# Patient Record
Sex: FEMALE | Race: WHITE | NOT HISPANIC OR LATINO | Employment: OTHER | URBAN - METROPOLITAN AREA
[De-identification: names, ages, dates, MRNs, and addresses within clinical notes are randomized per-mention and may not be internally consistent; named-entity substitution may affect disease eponyms.]

---

## 2017-05-03 ENCOUNTER — THERAPY VISIT (OUTPATIENT)
Dept: PHYSICAL THERAPY | Facility: CLINIC | Age: 62
End: 2017-05-03
Payer: COMMERCIAL

## 2017-05-03 DIAGNOSIS — M25.512 PAIN IN JOINT OF LEFT SHOULDER: Primary | ICD-10-CM

## 2017-05-03 PROCEDURE — 97161 PT EVAL LOW COMPLEX 20 MIN: CPT | Mod: GP | Performed by: PHYSICAL THERAPIST

## 2017-05-03 PROCEDURE — 97110 THERAPEUTIC EXERCISES: CPT | Mod: GP | Performed by: PHYSICAL THERAPIST

## 2017-05-03 PROCEDURE — 97112 NEUROMUSCULAR REEDUCATION: CPT | Mod: GP | Performed by: PHYSICAL THERAPIST

## 2017-05-03 NOTE — LETTER
[facility] ATHLETIC [facility] PHYSICAL THERAPY  [address]  [facility] [address]  [phone]    May 3, 2017    Re: Sujatha Alfredo   :   1955  MRN:  [id number]   REFERRING PHYSICIAN:   Lydia Gar MD    [facility] ATHLETIC [facility] PHYSICAL [facility]  Date of Initial Evaluation:  5/3/17  Visits:  Rxs Used: 1  Reason for Referral:  Pain in joint of left shoulder    EVALUATION SUMMARY    Subjective:    Patient is a 61 year old female presenting with rehab left shoulder hpi. The history is provided by the patient. No  was used.  Sujatha Alfredo is a 61 year old female with a left shoulder condition.  Condition occurred with:  Unknown cause.  Condition occurred: for unknown reasons.  Pt c/o L shoulder pain x 3 months.  Denies injury.  MD visit date 17.  Pt is R handed.  Had injection with good results.  Patient reports pain:  Anterior.  Pain is described as shooting and stabbing and is intermittent and reported as 3/10 and 8/10.  Associated symptoms:  Loss of motion/stiffness and loss of strength. Pain is the same all the time.  Symptoms are exacerbated by using arm overhead, lying on extremity, using arm behind back and lifting and relieved by rest.  Since onset symptoms are gradually improving (since injection).  Special tests:  X-ray (- per pt).  General health as reported by patient is good.  Pertinent medical history includes:  Smoking and overweight.  Medical allergies: no.  Other surgeries include:  None reported.  Current medications:  None as reported by the patient.  Current occupation is Retired.                      Objective:    Standing Alignment:  Shoulder/UE:  Rounded shoulders        Shoulder Evaluation:  ROM:  AROM:  normal (ERP all ROM L shoulder)  PROM:  normal  Strength:  : fair scap stab.  Flexion: Left:5-/5    Pain: +    Right: /5 strong/pain free    Pain:   Extension:  Left:/5 Strong/pain free     Pain:    Right: /5  Strong/pain free  Pain:  Abduction:  Left: 5- and 4+/5   Pain:-/+    Right:/5  Strong/pain free    Pain:  Adduction:  Left:/5  Strong/pain free   Pain:    Right:/5  Strong/pain free    Pain:  Internal Rotation:  Left:/5  Strong/pain free    Pain:    Right:/5  Strong/pain free    Pain:  External Rotation:   Left:4/5      Pain:+   Right:/5  Strong/pain free    Pain:    Special Tests:    Left shoulder positive for the following special tests:  Impingement  Palpation:  Left shoulder tenderness present at:  Supraspinatus  Mobility Tests:  Scapulohumeral rhythm right:  Hypermobile       Re: Sujatha Alfredo   :   1955                 Assessment/Plan:      Patient is a 61 year old female with left side shoulder complaints.  Patient has the following significant findings with corresponding treatment plan.  Diagnosis 1:  L shoulder pain  Pain -  hot/cold therapy, directional preference exercise and home program.  Decreased ROM/flexibility - manual therapy and therapeutic exercise.  Decreased strength - therapeutic exercise and therapeutic activities.  Impaired muscle performance - neuro re-education.  Decreased function - therapeutic activities.  Impaired posture - neuro re-education    Therapy Evaluation Codes:   1) History comprised of:   Personal factors that impact the plan of care:      None.    Comorbidity factors that impact the plan of care are:      Overweight and Smoking.     Medications impacting care: None.  2) Examination of Body Systems comprised of:   Body structures and functions that impact the plan of care:      Shoulder.   Activity limitations that impact the plan of care are:      Bathing, Dressing, Lifting and Sleeping.  3) Clinical presentation characteristics are:   Stable/Uncomplicated.  4) Decision-Making    Low complexity using standardized patient assessment instrument and/or measureable assessment of functional outcome.  Cumulative Therapy Evaluation is: Low  complexity.    Previous and current functional limitations:  (See Goal Flow Sheet for this information).     Short term and Long term goals: (See Goal Flow Sheet for this information).  Communication ability:  Patient appears to be able to clearly communicate and understand verbal and written communication and follow directions correctly.  Treatment Explanation - The following has been discussed with the patient:   RX ordered/plan of care.  This patient would benefit from PT intervention to resume normal activities.  Rehab potential is excellent.    Frequency:  1 X week, once daily  Duration:  for 8 weeks  Discharge Plan:  Achieve all LTG.  Independent in home treatment program.  Reach maximal therapeutic benefit.    Thank you for your referral.    INQUIRIES  Therapist:  [place], [facility]  [facility] PH[facility]  [id number] [place] [id number]  Phone: [phone]  Fax: [phone]

## 2017-05-03 NOTE — MR AVS SNAPSHOT
After Visit Summary   5/3/2017    Sujatha Alfredo    MRN: [id number]           Patient Information     Date Of Birth          1955        Visit Information        Provider Department      5/3/2017 1:00 PM [place] Kent, PT [facility]        Today's Diagnoses     Pain in joint of left shoulder    -  1       Follow-ups after your visit        Your next 10 appointments already scheduled     May 18, 2017 11:40 AM CDT   LILIB[facility] Extremity with [place] PT   [facility] Therapy ([facility] [place])    [address]  [facility] [id number] [phone]              Who to contact     If you have questions or need follow up information about today's clinic visit or your schedule please contact [facility] ATHLETIC [facility] PHYSICAL THERAPY directly at [phone].  Normal or non-critical lab and imaging results will be communicated to you by [employer], letter or phone within 4 business days after the clinic has received the results. If you do not hear from us within 7 days, please contact the clinic through [employer] or phone. If you have a critical or abnormal lab result, we will notify you by phone as soon as possible.  Submit refill requests through [employer] or call your pharmacy and they will forward the refill request to us. Please allow 3 business days for your refill to be completed.          Additional Information About Your Visit        MyChart Information     [employer] gives you secure access to your electronic health record. If you see a primary care provider, you can also send messages to your care team and make appointments. If you have questions, please call your primary care clinic.  If you do not have a primary care provider, please call [phone] and they will assist you.        Care EveryWhere ID     This is your Care EveryWhere ID. This could be used  by other organizations to access your [place] medical records  [id number]        Your Vitals Were     Last Period                   01/01/2003            Blood Pressure from Last 3 Encounters:   09/18/15 127/61   09/10/15 (!) 142/102   04/26/13 114/81    Weight from Last 3 Encounters:   09/10/15 85.2 kg (187 lb 14.4 oz)   04/26/13 78 kg (172 lb)   03/28/13 83.5 kg (184 lb 1.6 oz)              We Performed the Following     HC PT EVAL, LOW COMPLEXITY     LILIBETH INITIAL EVAL REPORT     NEUROMUSCULAR RE-EDUCATION     THERAPEUTIC EXERCISES        Primary Care Provider Office Phone # Fax #    Tom Alfredo [phone]       [address] [address]        Thank you!     Thank you for choosing [facility] PHYSICAL THERAP[facility]  for your care. Our goal is always to provide you with excellent care. Hearing back from our patients is one way we can continue to improve our services. Please take a few minutes to complete the written survey that you may receive in the mail after your visit with us. Thank you!             Your Updated Medication List - Protect others around you: Learn how to safely use, store and throw away your medicines at www.disposemymeds.org.      Notice  As of 5/3/2017  1:33 PM    You have not been prescribed any medications.

## 2017-05-03 NOTE — PROGRESS NOTES
Subjective:    Patient is a 61 year old female presenting with rehab left shoulder hpi. The history is provided by the patient. No  was used.   Sujatha Alfredo is a 61 year old female with a left shoulder condition.  Condition occurred with:  Unknown cause.  Condition occurred: for unknown reasons.    Pt c/o L shoulder pain x 3 months.  Denies injury.  MD visit date 4/17/17.  Pt is R handed.  Had injection with good results..    Patient reports pain:  Anterior.    Pain is described as shooting and stabbing and is intermittent and reported as 3/10 and 8/10.  Associated symptoms:  Loss of motion/stiffness and loss of strength. Pain is the same all the time.  Symptoms are exacerbated by using arm overhead, lying on extremity, using arm behind back and lifting and relieved by rest.  Since onset symptoms are gradually improving (since injection).  Special tests:  X-ray (- per pt).      General health as reported by patient is good.  Pertinent medical history includes:  Smoking and overweight.  Medical allergies: no.  Other surgeries include:  None reported.  Current medications:  None as reported by the patient.  Current occupation is Retired  .                                    Objective:    Standing Alignment:      Shoulder/UE:  Rounded shoulders                                       Shoulder Evaluation:  ROM:  AROM:  normal (ERP all ROM L shoulder)                            PROM:  normal                                Strength:  : fair scap stab.  Flexion: Left:5-/5    Pain: +    Right: /5 strong/pain free    Pain:   Extension:  Left:/5 Strong/pain free    Pain:    Right: /5  Strong/pain free  Pain:  Abduction:  Left: 5- and 4+/5   Pain:-/+    Right:/5  Strong/pain free    Pain:  Adduction:  Left:/5  Strong/pain free   Pain:    Right:/5  Strong/pain free    Pain:  Internal Rotation:  Left:/5  Strong/pain free    Pain:    Right:/5  Strong/pain free    Pain:  External Rotation:   Left:4/5       Pain:+   Right:/5  Strong/pain free    Pain:              Special Tests:    Left shoulder positive for the following special tests:  Impingement    Palpation:    Left shoulder tenderness present at:  Supraspinatus    Mobility Tests:                Scapulohumeral rhythm right:  Hypermobile                                     General     ROS    Assessment/Plan:      Patient is a 61 year old female with left side shoulder complaints.    Patient has the following significant findings with corresponding treatment plan.                Diagnosis 1:  L shoulder pain  Pain -  hot/cold therapy, directional preference exercise and home program  Decreased ROM/flexibility - manual therapy and therapeutic exercise  Decreased strength - therapeutic exercise and therapeutic activities  Impaired muscle performance - neuro re-education  Decreased function - therapeutic activities  Impaired posture - neuro re-education    Therapy Evaluation Codes:   1) History comprised of:   Personal factors that impact the plan of care:      None.    Comorbidity factors that impact the plan of care are:      Overweight and Smoking.     Medications impacting care: None.  2) Examination of Body Systems comprised of:   Body structures and functions that impact the plan of care:      Shoulder.   Activity limitations that impact the plan of care are:      Bathing, Dressing, Lifting and Sleeping.  3) Clinical presentation characteristics are:   Stable/Uncomplicated.  4) Decision-Making    Low complexity using standardized patient assessment instrument and/or measureable assessment of functional outcome.  Cumulative Therapy Evaluation is: Low complexity.    Previous and current functional limitations:  (See Goal Flow Sheet for this information)    Short term and Long term goals: (See Goal Flow Sheet for this information)     Communication ability:  Patient appears to be able to clearly communicate and understand verbal and written communication and follow  directions correctly.  Treatment Explanation - The following has been discussed with the patient:   RX ordered/plan of care  Anticipated outcomes  Possible risks and side effects  This patient would benefit from PT intervention to resume normal activities.   Rehab potential is excellent.    Frequency:  1 X week, once daily  Duration:  for 8 weeks  Discharge Plan:  Achieve all LTG.  Independent in home treatment program.  Reach maximal therapeutic benefit.    Please refer to the daily flowsheet for treatment today, total treatment time and time spent performing 1:1 timed codes.

## 2017-08-05 ENCOUNTER — HEALTH MAINTENANCE LETTER (OUTPATIENT)
Age: 62
End: 2017-08-05

## 2017-10-18 ENCOUNTER — HOSPITAL ENCOUNTER (OUTPATIENT)
Dept: MAMMOGRAPHY | Facility: CLINIC | Age: 62
Discharge: HOME | End: 2017-10-18
Attending: INTERNAL MEDICINE | Admitting: INTERNAL MEDICINE
Payer: COMMERCIAL

## 2017-10-18 DIAGNOSIS — Z12.31 VISIT FOR SCREENING MAMMOGRAM: ICD-10-CM

## 2017-10-18 PROCEDURE — 77063 BREAST TOMOSYNTHESIS BI: CPT

## 2018-08-12 ENCOUNTER — HEALTH MAINTENANCE LETTER (OUTPATIENT)
Age: 63
End: 2018-08-12

## 2018-10-16 ENCOUNTER — HOSPITAL ENCOUNTER (OUTPATIENT)
Dept: MAMMOGRAPHY | Facility: CLINIC | Age: 63
Discharge: HOME | End: 2018-10-16
Attending: INTERNAL MEDICINE | Admitting: INTERNAL MEDICINE
Payer: COMMERCIAL

## 2018-10-16 DIAGNOSIS — Z12.31 SCREENING MAMMOGRAM, ENCOUNTER FOR: ICD-10-CM

## 2018-10-16 PROCEDURE — 77067 SCR MAMMO BI INCL CAD: CPT

## 2018-11-29 ENCOUNTER — OFFICE VISIT (OUTPATIENT)
Dept: SURGERY | Facility: CLINIC | Age: 63
End: 2018-11-29
Payer: COMMERCIAL

## 2018-11-29 VITALS
SYSTOLIC BLOOD PRESSURE: 130 MMHG | BODY MASS INDEX: 28.59 KG/M2 | DIASTOLIC BLOOD PRESSURE: 76 MMHG | WEIGHT: 177.9 LBS | TEMPERATURE: 98.4 F | OXYGEN SATURATION: 97 % | HEART RATE: 81 BPM | HEIGHT: 66 IN

## 2018-11-29 DIAGNOSIS — R10.32 LEFT LOWER QUADRANT PAIN: Primary | ICD-10-CM

## 2018-11-29 RX ORDER — ASPIRIN 81 MG/1
81 TABLET, CHEWABLE ORAL
COMMUNITY
Start: 2015-04-02

## 2018-11-29 RX ORDER — LOSARTAN POTASSIUM 50 MG/1
TABLET ORAL
Refills: 1 | COMMUNITY
Start: 2018-10-25

## 2018-11-29 RX ORDER — ROSUVASTATIN CALCIUM 5 MG/1
TABLET, COATED ORAL
Refills: 0 | COMMUNITY
Start: 2018-08-30

## 2018-11-29 ASSESSMENT — PAIN SCALES - GENERAL: PAINLEVEL: NO PAIN (0)

## 2018-11-29 NOTE — MR AVS SNAPSHOT
"              After Visit Summary   11/29/2018    Sujatha Alfredo    MRN: [id number]           Patient Information     Date Of Birth          1955        Visit Information        Provider Department      11/29/2018 12:40 PM Bronson Mane MD [facility]        Today's Diagnoses     Left lower quadrant pain    -  1       Follow-ups after your visit        Who to contact     Please call your clinic at [phone] to:    Ask questions about your health    Make or cancel appointments    Discuss your medicines    Learn about your test results    Speak to your doctor            Additional Information About Your Visit        MyChart Information     "[employer] gives you secure access to your electronic health record. If you see a primary care provider, you can also send messages to your care team and make appointments. If you have questions, please call your primary care clinic.  If you do not have a primary care provider, please call [phone] and they will assist you.      "[employer] is an electronic gateway that provides easy, online access to your medical records. With "[employer] you can request a clinic appointment, read your test results, renew a prescription or communicate with your care team.     To access your existing account, please contact your [facility] or call [phone] for assistance.        Care EveryWhere ID     This is your Care EveryWhere ID. This could be used by other organizations to access your [place] medical records  [id number]        Your Vitals Were     Pulse Temperature Height Last Period Pulse Oximetry BMI (Body Mass Index)    81 98.4  F (36.9  C) (Oral) 1.676 m (5' 6\") 01/01/2003 97% 28.71 kg/m2       Blood Pressure from Last 3 Encounters:   11/29/18 130/76   09/18/15 127/61   09/10/15 (!) 142/102    Weight from Last 3 Encounters:   11/29/18 80.7 kg (177 lb 14.4 oz)   09/10/15 85.2 kg (187 lb 14.4 oz)   04/26/13 78 kg " (172 lb)              Today, you had the following     No orders found for display       Primary Care Provider Office Phone # Fax #    Tom Alfredo [phone]       [place] [address] [address] [facility] [address]        Equal Access to Services     ISHAN ORTEGA : Hadii melchor king marthao Soomaali, waaxda luqadaha, qaybta kaalmada adedarbyyada, randall kennyin hayaan robbydarby katz laRonalkwadwo presley. So [facility] [phone].    ATENCIÓN: Si habla español, tiene a hermosillo disposición servicios gratuitos de asistencia lingüística. Llame al [phone].    We comply with applicable federal civil rights laws and [place] laws. We do not discriminate on the basis of race, color, national origin, age, disability, sex, sexual orientation, or gender identity.            Thank you!     Thank you for choosing [facility] SURGICAL WEIGHT MANAGEMENT  for your care. Our goal is always to provide you with excellent care. Hearing back from our patients is one way we can continue to improve our services. Please take a few minutes to complete the written survey that you may receive in the mail after your visit with us. Thank you!             Your Updated Medication List - Protect others around you: Learn how to safely use, store and throw away your medicines at www.disposemymeds.org.          This list is accurate as of 11/29/18  3:39 PM.  Always use your most recent med list.                   Brand Name Dispense Instructions for use Diagnosis    aspirin 81 MG chewable tablet    ASA     Take 81 mg by mouth        FLUoxetine 20 MG capsule    PROzac     Take 20 mg by mouth        losartan 50 MG tablet    COZAAR     TK 1 T PO D        rosuvastatin 5 MG tablet    CRESTOR     TK 1 T PO D

## 2018-11-29 NOTE — PROGRESS NOTES
Return Bariatric Surgery Note    RE: Sujatha Alfredo  MR#: [id number]  : 1955  VISIT DATE: 2018      I had the pleasure of seeing Sujatha Alfredo in my post-bariatric surgery assessment clinic.    CHIEF COMPLAINT: Post-bariatric surgery follow-up    Sujatha has a pain in the left lower quadrant that is not associated with eating.    Has no problems with reflux/heartburn.    Not worse with activity levels; says that the pain is present when she is lying down.    No associated bulge.    Has only had lapband and conversion to gastric bypass operations (and diagnostic laparoscopy about one year later for obstruction) with incisions all located at or above the umbilicus.    No associated bowel obstructions or other hospitalizations related to this.    There is a CT scan, not available for review today, in the [facility] system.    HISTORY OF PRESENT ILLNESS:  Questions Regarding Prior Weight Loss Surgery Reviewed With Patient 2018   I had the following weight loss procedure: Corey-en-y Gastric Bypass   What year was your surgery? approx 12 years ago?   How has your weight changed since your last visit? I have stayed about the same   Are you currently taking any weight loss medications? No   Do you currently have any of the following: None of the above   Have you been to the Emergency room since your last visit with us? No   Were you in the hospital since your last visit with us? No   Do you have any concerns today? yes, hernia       Weight History:     2018   What is your highest lifetime weight? 250   What is your lowest weight since surgery? (In pounds) 163        Current Weight: Weight: 80.7 kg (177 lb 14.4 oz)          Questions Regarding Co-Morbidities and Health Concerns Reviewed With Patient 2018   Pre-diabetes: Never   Diabetes II: Never   High Blood Pressure: Never   High cholesterol: Never   Heartburn/Reflux: Never   Sleep apnea: Never   PCOS: Never   Back pain: Gone  "away   Joint pain: Never   Lower leg swelling: Never       Eating Habits 11/28/2018   How many meals do you eat per day? 3   Do you snack between meals? Sometimes   How much food are you eating at each meal? 1/2 cup to 1 cup   Are you able to separate your meals and liquids by at least 30 minutes? Sometimes   Are you able to avoid liquid calories? Sometimes       Exercise Questions Reviewed With Patient 11/28/2018   How often do you exercise? 1 to 2 times per week   What is the duration of your exercise (in minutes)? 30 Minutes   What types of exercise do you do? home gym   What keeps you from being more active?  I should be more active but I just have not gotten around to it       Social History:      11/28/2018   Are you smoking? No   Are you drinking alcohol? Yes   How much alcohol? 1 glass/day       Medications:  Current Outpatient Prescriptions   Medication     aspirin (ASA) 81 MG chewable tablet     FLUoxetine (PROZAC) 20 MG capsule     losartan (COZAAR) 50 MG tablet     rosuvastatin (CRESTOR) 5 MG tablet     No current facility-administered medications for this visit.          11/28/2018   Do you avoid NSAIDs such as (Ibuprofen, Aleve, Naproxen, Advil)?   Yes       ROS:  GI:      11/28/2018   Vomiting: No   Diarrhea: No   Constipation: No   Swallowing trouble: No   Abdominal pain: Yes   Heartburn: Yes   Rash in skin folds: No   Depression: No   Stress urinary incontinence No     Skin:   BAR RBS ROS - SKIN 11/28/2018   Rash in skin folds: No     Psych:      11/28/2018   Depression: No   Anxiety: No     Female Only:   TONJA RBS ROS - FEMALE ONLY 11/28/2018   Female only: None of the above       LABS/IMAGING/MEDICAL RECORDS REVIEW: done by pcp.  Reports taking recommended vitamins.    PHYSICAL EXAMINATION:  /76  Pulse 81  Temp 98.4  F (36.9  C) (Oral)  Ht 1.676 m (5' 6\")  Wt 80.7 kg (177 lb 14.4 oz)  LMP 01/01/2003  SpO2 97%  BMI 28.71 kg/m2   NAD  Left lower quadrant of abdomen has point tenderness " about 10cm left and 8cm below umbilicus.  No peritonitis.  No bulge.  No associated skin color changes.  Nearest incision is well above the area.    ASSESSMENT AND PLAN:      1. Many  years status laparoscopic gastric bypass (after prior lapband)  2. Morbid Obesity historical; gastric achlorhydria and left lower quadrant abdominal pain current current BMI: Body mass index is 28.71 kg/(m^2).  3. Post surgical malabsorption:   Labs ordered per protocol.   Follow food plan per dietitian recommendations.   Continue taking recommended post-op vitamins.  4. I will review CT scan images from CT scan [facility] kidney [facility].  Images not available to me currently..    Sincerely,    Bronson Mane MD    I spent a total of 25 minutes face to face with Sujatha during today's office visit. Over 50% of this time was spent counseling the patient and/or coordinating care.        CT scan report available:    CT Abd Pelvis W/WO IV Cont IVP Hematuria (11/01/2018 12:35 PM CDT)  CT Abd Pelvis W/WO IV Cont IVP Hematuria (11/01/2018 12:35 PM CDT)   Narrative Performed At   [facility]    CT ABDOMEN AND PELVIS WITHOUT AND WITH CONTRAST (CT IVP)    11/1/2018 12:35 PM        INDICATION: Gross hematuria.    TECHNIQUE: Helical thin-section CT scan of the abdomen and pelvis was performed before and after injection of IV contrast. Precontrast, 100 seconds postinjection, and 10-minute delayed images post 250 mL normal saline were performed as per CT IVP protocol. Postprocessing of data. From the helical source data, thin-section reconstruction performed in the axial plane with coronal AP reformats. Dose reduction techniques were used.     IV CONTRAST: 125 mL Omnipaque 350.    COMPARISON: None.        FINDINGS:    LUNG BASES: Clear.        CT IVP: No urinary tract stones identified. No hydronephrosis. Small focus of vascular calcification seen within the left renal pelvis on image 34 of series 4. Delayed imaging does not  demonstrate any urothelial lesions.        ABDOMEN: The liver is low-dense in appearance, compatible with hepatic steatosis. The kidneys, adrenal glands, spleen, gallbladder, and pancreas are grossly normal.        The abdominal aorta is normal in caliber with diffuse atherosclerotic calcifications. No lymphadenopathy. Surgical changes of a Corey-en-Y gastric bypass. No bowel obstruction or abnormal bowel wall thickening.        PELVIS: Air is present within the lumen of the urinary bladder. No urothelial lesions or evidence of urinary bladder stones. Calcifications involving the uterus with the largest seen along the right side of uterus are compatible with calcified fibroids. No pelvic free fluid or lymphadenopathy.        MUSCULOSKELETAL: There is bilateral spondylolysis of L5. Severe L5-S1 disc height loss.        CONCLUSION:    1.  No urinary tract stones or urothelial lesions to explain patient's hematuria.    2.  Small amount of air within the lumen of the urinary bladder. This could be due to recent urinary bladder instrumentation. An infectious process is also a consideration. A fistulous communication from the bowel to bladder is a possibility though no obvious fistulous tract is seen. A cystogram could be performed if this is of clinical concern.    3.  Hepatic steatosis.

## 2018-11-29 NOTE — LETTER
2018       RE: Sujatha Alfredo  [address]  [facility] [address]     Dear Colleague,    Thank you for referring your patient, Sujatha Alfredo, to the [facility] SURGICAL WEIGHT MANAGEMENT at [facility]. Please see a copy of my visit note below.        Return Bariatric Surgery Note    RE: Sujatha Alfredo  MR#: [id number]  : 1955  VISIT DATE: 2018      I had the pleasure of seeing Sujatha Alfredo in my post-bariatric surgery assessment clinic.    CHIEF COMPLAINT: Post-bariatric surgery follow-up    Sujatha has a pain in the left lower quadrant that is not associated with eating.    Has no problems with reflux/heartburn.    Not worse with activity levels; says that the pain is present when she is lying down.    No associated bulge.    Has only had lapband and conversion to gastric bypass operations (and diagnostic laparoscopy about one year later for obstruction) with incisions all located at or above the umbilicus.    No associated bowel obstructions or other hospitalizations related to this.    There is a CT scan, not available for review today, in the [facility] system.    HISTORY OF PRESENT ILLNESS:  Questions Regarding Prior Weight Loss Surgery Reviewed With Patient 2018   I had the following weight loss procedure: Corey-en-y Gastric Bypass   What year was your surgery? approx 12 years ago?   How has your weight changed since your last visit? I have stayed about the same   Are you currently taking any weight loss medications? No   Do you currently have any of the following: None of the above   Have you been to the Emergency room since your last visit with us? No   Were you in the hospital since your last visit with us? No   Do you have any concerns today? yes, hernia       Weight History:     2018   What is your highest lifetime weight? 250   What is your lowest weight since surgery? (In pounds) 163        Current  Weight: Weight: 80.7 kg (177 lb 14.4 oz)          Questions Regarding Co-Morbidities and Health Concerns Reviewed With Patient 11/28/2018   Pre-diabetes: Never   Diabetes II: Never   High Blood Pressure: Never   High cholesterol: Never   Heartburn/Reflux: Never   Sleep apnea: Never   PCOS: Never   Back pain: Gone away   Joint pain: Never   Lower leg swelling: Never       Eating Habits 11/28/2018   How many meals do you eat per day? 3   Do you snack between meals? Sometimes   How much food are you eating at each meal? 1/2 cup to 1 cup   Are you able to separate your meals and liquids by at least 30 minutes? Sometimes   Are you able to avoid liquid calories? Sometimes       Exercise Questions Reviewed With Patient 11/28/2018   How often do you exercise? 1 to 2 times per week   What is the duration of your exercise (in minutes)? 30 Minutes   What types of exercise do you do? home gym   What keeps you from being more active?  I should be more active but I just have not gotten around to it       Social History:      11/28/2018   Are you smoking? No   Are you drinking alcohol? Yes   How much alcohol? 1 glass/day       Medications:  Current Outpatient Prescriptions   Medication     aspirin (ASA) 81 MG chewable tablet     FLUoxetine (PROZAC) 20 MG capsule     losartan (COZAAR) 50 MG tablet     rosuvastatin (CRESTOR) 5 MG tablet     No current facility-administered medications for this visit.          11/28/2018   Do you avoid NSAIDs such as (Ibuprofen, Aleve, Naproxen, Advil)?   Yes       ROS:  GI:      11/28/2018   Vomiting: No   Diarrhea: No   Constipation: No   Swallowing trouble: No   Abdominal pain: Yes   Heartburn: Yes   Rash in skin folds: No   Depression: No   Stress urinary incontinence No     Skin:   BAR RBS ROS - SKIN 11/28/2018   Rash in skin folds: No     Psych:      11/28/2018   Depression: No   Anxiety: No     Female Only:   BAR RBS ROS - FEMALE ONLY 11/28/2018   Female only: None of the above  "      LABS/IMAGING/MEDICAL RECORDS REVIEW: done by pcp.  Reports taking recommended vitamins.    PHYSICAL EXAMINATION:  /76  Pulse 81  Temp 98.4  F (36.9  C) (Oral)  Ht 1.676 m (5' 6\")  Wt 80.7 kg (177 lb 14.4 oz)  LMP 01/01/2003  SpO2 97%  BMI 28.71 kg/m2   NAD  Left lower quadrant of abdomen has point tenderness about 10cm left and 8cm below umbilicus.  No peritonitis.  No bulge.  No associated skin color changes.  Nearest incision is well above the area.    ASSESSMENT AND PLAN:      1. Many  years status laparoscopic gastric bypass (after prior lapband)  2. Morbid Obesity historical; gastric achlorhydria and left lower quadrant abdominal pain current current BMI: Body mass index is 28.71 kg/(m^2).  3. Post surgical malabsorption:   Labs ordered per protocol.   Follow food plan per dietitian recommendations.   Continue taking recommended post-op vitamins.  4. I will review CT scan images from CT scan for kidney disease at [facility].  Images not available to me currently..    Sincerely,    Bronson Mane MD    I spent a total of 25 minutes face to face with Sujatha during today's office visit. Over 50% of this time was spent counseling the patient and/or coordinating care.        CT scan report available:    CT Abd Pelvis W/WO IV Cont IVP Hematuria (11/01/2018 12:35 PM CDT)  CT Abd Pelvis W/WO IV Cont IVP Hematuria (11/01/2018 12:35 PM CDT)   Narrative Performed At   [facility]    CT ABDOMEN AND PELVIS WITHOUT AND WITH CONTRAST (CT IVP)    11/1/2018 12:35 PM        INDICATION: Gross hematuria.    TECHNIQUE: Helical thin-section CT scan of the abdomen and pelvis was performed before and after injection of IV contrast. Precontrast, 100 seconds postinjection, and 10-minute delayed images post 250 mL normal saline were performed as per CT IVP protocol. Postprocessing of data. From the helical source data, thin-section reconstruction performed in the axial plane with coronal AP reformats. " Dose reduction techniques were used.     IV CONTRAST: 125 mL Omnipaque 350.    COMPARISON: None.        FINDINGS:    LUNG BASES: Clear.        CT IVP: No urinary tract stones identified. No hydronephrosis. Small focus of vascular calcification seen within the left renal pelvis on image 34 of series 4. Delayed imaging does not demonstrate any urothelial lesions.        ABDOMEN: The liver is low-dense in appearance, compatible with hepatic steatosis. The kidneys, adrenal glands, spleen, gallbladder, and pancreas are grossly normal.        The abdominal aorta is normal in caliber with diffuse atherosclerotic calcifications. No lymphadenopathy. Surgical changes of a Corey-en-Y gastric bypass. No bowel obstruction or abnormal bowel wall thickening.        PELVIS: Air is present within the lumen of the urinary bladder. No urothelial lesions or evidence of urinary bladder stones. Calcifications involving the uterus with the largest seen along the right side of uterus are compatible with calcified fibroids. No pelvic free fluid or lymphadenopathy.        MUSCULOSKELETAL: There is bilateral spondylolysis of L5. Severe L5-S1 disc height loss.        CONCLUSION:    1.  No urinary tract stones or urothelial lesions to explain patient's hematuria.    2.  Small amount of air within the lumen of the urinary bladder. This could be due to recent urinary bladder instrumentation. An infectious process is also a consideration. A fistulous communication from the bowel to bladder is a possibility though no obvious fistulous tract is seen. A cystogram could be performed if this is of clinical concern.    3.  Hepatic steatosis.          Again, thank you for allowing me to participate in the care of your patient.      Sincerely,    Bronson Mane MD

## 2018-11-29 NOTE — NURSING NOTE
"Chief Complaint   Patient presents with     Consult       Vitals:    11/29/18 1230   BP: 130/76   Pulse: 81   Temp: 98.4  F (36.9  C)   TempSrc: Oral   SpO2: 97%   Weight: 80.7 kg (177 lb 14.4 oz)   Height: 1.676 m (5' 6\")       Body mass index is 28.71 kg/(m^2).    TONIE Ruiz                          "

## 2018-11-30 ENCOUNTER — CARE COORDINATION (OUTPATIENT)
Dept: SURGERY | Facility: CLINIC | Age: 63
End: 2018-11-30

## 2018-12-03 ENCOUNTER — CARE COORDINATION (OUTPATIENT)
Dept: SURGERY | Facility: CLINIC | Age: 63
End: 2018-12-03

## 2018-12-03 NOTE — PROGRESS NOTES
Patient notified per Dr. Mane after reviewing Ct that there was no evidence of any problems. Patient was ok with notification no further questions.

## 2019-11-01 ENCOUNTER — HOSPITAL ENCOUNTER (OUTPATIENT)
Dept: MAMMOGRAPHY | Facility: CLINIC | Age: 64
Discharge: HOME | End: 2019-11-01
Attending: INTERNAL MEDICINE | Admitting: INTERNAL MEDICINE
Payer: COMMERCIAL

## 2019-11-01 DIAGNOSIS — Z12.31 OTHER SCREENING MAMMOGRAM: ICD-10-CM

## 2019-11-01 PROCEDURE — 77063 BREAST TOMOSYNTHESIS BI: CPT

## 2019-11-05 ENCOUNTER — HEALTH MAINTENANCE LETTER (OUTPATIENT)
Age: 64
End: 2019-11-05

## 2020-02-16 ENCOUNTER — HEALTH MAINTENANCE LETTER (OUTPATIENT)
Age: 65
End: 2020-02-16

## 2020-11-04 ENCOUNTER — HOSPITAL ENCOUNTER (OUTPATIENT)
Dept: MAMMOGRAPHY | Facility: CLINIC | Age: 65
Discharge: HOME | End: 2020-11-04
Attending: FAMILY MEDICINE | Admitting: FAMILY MEDICINE
Payer: COMMERCIAL

## 2020-11-04 DIAGNOSIS — Z12.31 VISIT FOR SCREENING MAMMOGRAM: ICD-10-CM

## 2020-11-04 PROCEDURE — 77067 SCR MAMMO BI INCL CAD: CPT

## 2021-04-04 ENCOUNTER — HEALTH MAINTENANCE LETTER (OUTPATIENT)
Age: 66
End: 2021-04-04

## 2021-09-19 ENCOUNTER — HEALTH MAINTENANCE LETTER (OUTPATIENT)
Age: 66
End: 2021-09-19

## 2021-11-15 ENCOUNTER — HOSPITAL ENCOUNTER (OUTPATIENT)
Dept: MAMMOGRAPHY | Facility: CLINIC | Age: 66
Discharge: HOME | End: 2021-11-15
Attending: INTERNAL MEDICINE | Admitting: INTERNAL MEDICINE
Payer: COMMERCIAL

## 2021-11-15 DIAGNOSIS — Z12.31 VISIT FOR SCREENING MAMMOGRAM: ICD-10-CM

## 2021-11-15 PROCEDURE — 77063 BREAST TOMOSYNTHESIS BI: CPT

## 2022-01-18 ENCOUNTER — TRANSCRIBE ORDERS (OUTPATIENT)
Dept: OTHER | Age: 67
End: 2022-01-18

## 2022-01-18 DIAGNOSIS — Z98.890 S/P THORACOTOMY: Primary | ICD-10-CM

## 2022-03-09 ENCOUNTER — HOSPITAL ENCOUNTER (OUTPATIENT)
Dept: CARDIAC REHAB | Facility: CLINIC | Age: 67
End: 2022-03-09
Attending: THORACIC SURGERY (CARDIOTHORACIC VASCULAR SURGERY)
Payer: COMMERCIAL

## 2022-03-09 DIAGNOSIS — Z98.890 S/P THORACOTOMY: ICD-10-CM

## 2022-03-09 PROCEDURE — G0237 THERAPEUTIC PROCD STRG ENDUR: HCPCS

## 2022-03-09 PROCEDURE — G0238 OTH RESP PROC, INDIV: HCPCS

## 2022-03-31 ENCOUNTER — HOSPITAL ENCOUNTER (OUTPATIENT)
Dept: CARDIAC REHAB | Facility: CLINIC | Age: 67
Discharge: HOME | End: 2022-03-31
Attending: THORACIC SURGERY (CARDIOTHORACIC VASCULAR SURGERY)
Payer: COMMERCIAL

## 2022-03-31 PROCEDURE — 999N000104 HC STATISTIC NO CHARGE

## 2022-05-01 ENCOUNTER — HEALTH MAINTENANCE LETTER (OUTPATIENT)
Age: 67
End: 2022-05-01

## 2022-09-06 ENCOUNTER — HOSPITAL ENCOUNTER (OUTPATIENT)
Dept: MAMMOGRAPHY | Facility: CLINIC | Age: 67
Discharge: HOME | End: 2022-09-06
Attending: INTERNAL MEDICINE | Admitting: INTERNAL MEDICINE
Payer: COMMERCIAL

## 2022-09-06 DIAGNOSIS — Z12.31 VISIT FOR SCREENING MAMMOGRAM: ICD-10-CM

## 2022-09-06 PROCEDURE — 77067 SCR MAMMO BI INCL CAD: CPT

## 2022-11-20 ENCOUNTER — HEALTH MAINTENANCE LETTER (OUTPATIENT)
Age: 67
End: 2022-11-20

## 2023-06-02 ENCOUNTER — HEALTH MAINTENANCE LETTER (OUTPATIENT)
Age: 68
End: 2023-06-02

## 2023-09-11 ENCOUNTER — HOSPITAL ENCOUNTER (OUTPATIENT)
Dept: MAMMOGRAPHY | Facility: CLINIC | Age: 68
Discharge: HOME | End: 2023-09-11
Attending: INTERNAL MEDICINE | Admitting: INTERNAL MEDICINE
Payer: COMMERCIAL

## 2023-09-11 DIAGNOSIS — Z12.31 VISIT FOR SCREENING MAMMOGRAM: ICD-10-CM

## 2023-09-11 PROCEDURE — 77067 SCR MAMMO BI INCL CAD: CPT

## 2023-12-13 ENCOUNTER — APPOINTMENT (OUTPATIENT)
Dept: GENERAL RADIOLOGY | Facility: CLINIC | Age: 68
End: 2023-12-13
Attending: EMERGENCY MEDICINE
Payer: COMMERCIAL

## 2023-12-13 ENCOUNTER — HOSPITAL ENCOUNTER (EMERGENCY)
Facility: CLINIC | Age: 68
Discharge: HOME | End: 2023-12-13
Attending: EMERGENCY MEDICINE | Admitting: EMERGENCY MEDICINE
Payer: COMMERCIAL

## 2023-12-13 VITALS
OXYGEN SATURATION: 97 % | HEART RATE: 96 BPM | BODY MASS INDEX: 25.51 KG/M2 | TEMPERATURE: 97.1 F | SYSTOLIC BLOOD PRESSURE: 183 MMHG | DIASTOLIC BLOOD PRESSURE: 113 MMHG | WEIGHT: 158.07 LBS | RESPIRATION RATE: 16 BRPM

## 2023-12-13 DIAGNOSIS — R07.81 RIB PAIN ON LEFT SIDE: ICD-10-CM

## 2023-12-13 PROCEDURE — 99284 EMERGENCY DEPT VISIT MOD MDM: CPT

## 2023-12-13 PROCEDURE — 96372 THER/PROPH/DIAG INJ SC/IM: CPT | Performed by: EMERGENCY MEDICINE

## 2023-12-13 PROCEDURE — 250N000011 HC RX IP 250 OP 636: Performed by: EMERGENCY MEDICINE

## 2023-12-13 PROCEDURE — 250N000013 HC RX MED GY IP 250 OP 250 PS 637: Performed by: EMERGENCY MEDICINE

## 2023-12-13 PROCEDURE — 71101 X-RAY EXAM UNILAT RIBS/CHEST: CPT | Mod: LT

## 2023-12-13 RX ORDER — KETOROLAC TROMETHAMINE 15 MG/ML
15 INJECTION, SOLUTION INTRAMUSCULAR; INTRAVENOUS ONCE
Status: COMPLETED | OUTPATIENT
Start: 2023-12-13 | End: 2023-12-13

## 2023-12-13 RX ORDER — NAPROXEN 500 MG/1
500 TABLET ORAL 2 TIMES DAILY WITH MEALS
Qty: 28 TABLET | Refills: 0 | Status: SHIPPED | OUTPATIENT
Start: 2023-12-13 | End: 2023-12-27

## 2023-12-13 RX ORDER — ESZOPICLONE 2 MG/1
2 TABLET, FILM COATED ORAL
COMMUNITY

## 2023-12-13 RX ORDER — ACETAMINOPHEN 500 MG
1000 TABLET ORAL ONCE
Status: COMPLETED | OUTPATIENT
Start: 2023-12-13 | End: 2023-12-13

## 2023-12-13 RX ORDER — FAMOTIDINE 20 MG
2000 TABLET ORAL
COMMUNITY

## 2023-12-13 RX ORDER — VARENICLINE TARTRATE 1 MG/1
1 TABLET, FILM COATED ORAL 2 TIMES DAILY WITH MEALS
COMMUNITY

## 2023-12-13 RX ORDER — SEMAGLUTIDE 1 MG/.5ML
1 INJECTION, SOLUTION SUBCUTANEOUS
COMMUNITY

## 2023-12-13 RX ORDER — GABAPENTIN 300 MG/1
300 CAPSULE ORAL PRN
COMMUNITY
Start: 2023-04-10

## 2023-12-13 RX ORDER — LIDOCAINE 4 G/G
1 PATCH TOPICAL ONCE
Status: DISCONTINUED | OUTPATIENT
Start: 2023-12-13 | End: 2023-12-13 | Stop reason: HOSPADM

## 2023-12-13 RX ADMIN — KETOROLAC TROMETHAMINE 15 MG: 15 INJECTION, SOLUTION INTRAMUSCULAR; INTRAVENOUS at 08:38

## 2023-12-13 RX ADMIN — ACETAMINOPHEN 1000 MG: 500 TABLET, FILM COATED ORAL at 08:38

## 2023-12-13 RX ADMIN — LIDOCAINE 1 PATCH: 4 PATCH TOPICAL at 08:42

## 2023-12-13 ASSESSMENT — ACTIVITIES OF DAILY LIVING (ADL): ADLS_ACUITY_SCORE: 35

## 2023-12-13 NOTE — DISCHARGE INSTRUCTIONS
Your x-ray does not show any evidence of a rib fracture.  There is still a chance that there is a small nondisplaced fracture that the x-ray cannot .  However, the management for you would not change whether there is a small fracture or not.  Our main goal is pain control.  We have given you a prescription for naproxen, which is a long-acting anti-inflammatory medication.  Is important that you do not take ibuprofen, Advil, Aleve, Motrin while taking naproxen.  We recommend that you schedule at 1000 mg of Tylenol every 6 hours as well.  You can also try lidocaine patches, which you can purchase over-the-counter.  It would likely take several weeks for your pain to resolve.  If your pain is not improving over the coming weeks, we recommend a follow-up with her primary care provider.  Please return the emergency department if you develop any new or concerning symptoms.

## 2023-12-13 NOTE — ED TRIAGE NOTES
Aprox 1 week hx L rib pain above diaphram. Pt states pain began after bending over and handing food to her dog. Denies fall, SOB, cough, URI, dizziness, HA, or further sx. ABC in tact. A/OX4, hypertension, denies hx.

## 2023-12-13 NOTE — ED PROVIDER NOTES
History     Chief Complaint:  Rib Pain       The history is provided by the patient.      Sujatha Alfredo is a 68 year old female with a history of lung cancer, osteoporosis, hypertension, and bowel obstruction who presents with left-sided lower rib pain. She first noticed the pain 10 days when she was sitting on the couch, and felt a sharp pain when she went to reach down. She continues to have it since then, but it has only worsened slightly. The pain tends to be exacerbated by laying on her left side and when she raises her hands above her head. She has been taking taking Advil, gabapentin, and ibuprofen for pain management with no improvement from either of them. She last took 3 doses of ibuprofen at approximately 0300 this morning. She has undergone multiple abdominal surgeries in the past including a gastric bypass and small bowel obstruction repair. She denies shortness of breath, cough, fever, chills, constipation, diarrhea, urinary symptoms, abdominal pain, chest pain, back pain, numbness/tingling, or weakness.     Independent Historian:   None - Patient Only    Review of External Notes:   NA     Medications:    Asprin   Prozac   Cozaar   Crestor     Past Medical History:    Depression   HTN   Insomnia  Obesity   HLD   Obesity   Bowel obstruction   Prediabetes   Osteoporosis   Hypercholesteremia   Lung cancer   Spondylolisthesis     Past Surgical History:    Gastroplasty Corey-EN-Y w/ adjustable gastric band   Appendectomy   SBO repair    R lobectomy     Physical Exam   Patient Vitals for the past 24 hrs:   BP Temp Temp src Pulse Resp SpO2 Weight   12/13/23 0800 (!) 183/113 97.1  F (36.2  C) Temporal 96 16 97 % 71.7 kg (158 lb 1.1 oz)        Physical Exam  General: Resting on the bed.  Head: No obvious trauma to head.  Ears, Nose, Throat:  External ears normal.  Nose normal.  No pharyngeal erythema, swelling or exudate.  Midline uvula. Moist mucus membranes.  Eyes:  Conjunctivae clear.   Neck: Normal  range of motion.  Neck supple.   CV: Regular rate and rhythm.  No murmurs.      Ribs: Left inferior anterior pinpoint rib pain to palpation.   Respiratory: Effort normal and breath sounds normal.  No wheezing or crackles.   Gastrointestinal: Soft.  No distension. There is no tenderness.  There is no rigidity, no rebound and no guarding.   Musculoskeletal: Normal range of motion.  Non tender extremities to palpations.    Neuro: Alert. Moving all extremities appropriately.  Normal speech.    Skin: Skin is warm and dry.  No rash noted.   Psych: Normal mood and affect. Behavior is normal.     Emergency Department Course   Imaging:  Ribs XR, unilat 3 views + PA chest,  left  Final Result  IMPRESSION: Lungs clear. Slight elevation of the right hemidiaphragm.  Surgical clips in the mediastinum  and stomach. No rib fractures.  SHARIF NINO MD     Results per radiology     Emergency Department Course & Assessments:    Interventions:  0838 Tylenol 1 g PO  0838 Toradol 15 mg IM  0842 Lidocaine 1 patch TD      Assessments:  0806 I obtained history and examined the patient as noted above.  0922 I rechecked the patient and explained findings. The pain improved, and I discussed plan for discharge home.    Independent Interpretation (X-rays, CTs, rhythm strip):  I reviewed the patient's Rib XR which is negative for fractures.     Consultations/Discussion of Management or Tests:  None      Social Determinants of Health affecting care:   None    Disposition:  The patient was discharged to home.     Impression & Plan    Medical Decision Making:  Patient presents with left inferior rib pain.  She has no abdominal tenderness to palpation, and no other symptoms.  She appears to have localized tenderness to palpation on chest wall.  I discussed imaging with the patient, and stated that an x-ray may show an obvious fracture, but is not as sensitive as a CT scan.  Given that the patient has no other symptoms, I discussed with her  that an x-ray would be an appropriate place to start.  This is obtained, and is negative for fracture.  She is given Tylenol, Toradol and a lidocaine patch.  Upon reevaluation, she reports her pain has completely resolved.  Had a further discussion with her regarding CT imaging.  There is no definitive treatment for a rib fracture other than pain control, also whether we identify a fracture or not, the acute management would be the same.  She is agreeable to no further advanced imaging.  She is given a prescription for naproxen, and is instructed to schedule Tylenol, as well as try over-the-counter lidocaine patches.  She is encouraged to follow-up with her primary care provider.  Return precautions are given and she verbalizes understanding.  She is discharged home in stable condition with her .    Diagnosis:    ICD-10-CM    1. Rib pain on left side  R07.81            Discharge Medications:  New Prescriptions    NAPROXEN (NAPROSYN) 500 MG TABLET    Take 1 tablet (500 mg) by mouth 2 times daily (with meals) for 14 days      Scribe Disclosure:  I, Surendra Dobson, am serving as a scribe at 8:39 AM on 12/13/2023 to document services personally performed by Abhi Whitten MD based on my observations and the provider's statements to me.   12/13/2023   Abhi Whitten MD Peery, Stephen, MD  12/13/23 3053

## 2024-06-22 ENCOUNTER — HEALTH MAINTENANCE LETTER (OUTPATIENT)
Age: 69
End: 2024-06-22

## 2025-01-04 ENCOUNTER — HEALTH MAINTENANCE LETTER (OUTPATIENT)
Age: 70
End: 2025-01-04

## 2025-02-21 ENCOUNTER — ANCILLARY PROCEDURE (OUTPATIENT)
Dept: MAMMOGRAPHY | Facility: CLINIC | Age: 70
End: 2025-02-21
Payer: COMMERCIAL

## 2025-02-21 DIAGNOSIS — Z12.31 VISIT FOR SCREENING MAMMOGRAM: ICD-10-CM

## 2025-03-28 ENCOUNTER — ANCILLARY PROCEDURE (OUTPATIENT)
Dept: MAMMOGRAPHY | Facility: CLINIC | Age: 70
End: 2025-03-28
Payer: COMMERCIAL

## 2025-03-28 PROCEDURE — 77063 BREAST TOMOSYNTHESIS BI: CPT | Mod: TC | Performed by: RADIOLOGY

## 2025-03-28 PROCEDURE — 77067 SCR MAMMO BI INCL CAD: CPT | Mod: TC | Performed by: RADIOLOGY

## 2025-06-21 ENCOUNTER — HEALTH MAINTENANCE LETTER (OUTPATIENT)
Age: 70
End: 2025-06-21